# Patient Record
Sex: MALE | Race: BLACK OR AFRICAN AMERICAN | Employment: UNEMPLOYED | URBAN - METROPOLITAN AREA
[De-identification: names, ages, dates, MRNs, and addresses within clinical notes are randomized per-mention and may not be internally consistent; named-entity substitution may affect disease eponyms.]

---

## 2018-10-22 ENCOUNTER — OFFICE VISIT (OUTPATIENT)
Dept: NEUROLOGY | Age: 63
End: 2018-10-22

## 2018-10-22 VITALS
BODY MASS INDEX: 23.24 KG/M2 | OXYGEN SATURATION: 97 % | HEIGHT: 74 IN | DIASTOLIC BLOOD PRESSURE: 70 MMHG | SYSTOLIC BLOOD PRESSURE: 124 MMHG | RESPIRATION RATE: 18 BRPM | HEART RATE: 65 BPM | WEIGHT: 181.1 LBS

## 2018-10-22 DIAGNOSIS — M54.12 CERVICAL RADICULOPATHY: ICD-10-CM

## 2018-10-22 DIAGNOSIS — G56.80: Primary | ICD-10-CM

## 2018-10-22 RX ORDER — LISINOPRIL AND HYDROCHLOROTHIAZIDE 20; 25 MG/1; MG/1
TABLET ORAL DAILY
COMMUNITY

## 2018-10-22 RX ORDER — MELOXICAM 7.5 MG/1
TABLET ORAL DAILY
COMMUNITY

## 2018-10-22 RX ORDER — AMLODIPINE BESYLATE 5 MG/1
5 TABLET ORAL DAILY
COMMUNITY

## 2018-10-22 RX ORDER — LORATADINE 10 MG/1
10 TABLET ORAL
COMMUNITY
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
## 2018-10-22 NOTE — PATIENT INSTRUCTIONS
Information Regarding Testing     If you have physican order for a test or a medication denied by your insurance company, this does not mean the test or medication is not appropriate for you as that is a medical decision, not a decision to be made by an insurance company representative or by an [facility] physician who has not interviewed and examined you. This is a decision to be made between you and your physician. The denial of services is a contractual matter between you and your insurance company, not an issue between your physician and the insurance company. If your test or medication is denied, you can take the following steps to help resolve the issue:    1. File a complaint with the [facility] regarding your insurance company's denial of services ordered for you. You can do this either by calling them directly or by completing an on-line complaint form on the [employer]. This can be found at [email or web address]    2. Also file a formal complaint with your insurance company and ask to have the name of the person denying the service so that you may explore a legal option should you be harmed by this denial of service. Again, the fact the insurance company will not pay for the service does not mean it is not medically necessary and I would encourage you to follow through with the plan that was made with your physician    3. File a written complaint with your employer so your employer and benefit manager is aware of the poor coverage they are providing their employees. If you have medicare/medicaid, complain to your representative in the House and to your Ltia Mccrary. If we have ordered testing for you, we do not call patients with results and we do not give test results over the phone. We schedule follow up appointments so that your results can be discussed in person and any questions you have regarding them may be addressed.   If something of concern is revealed on your test, we will call you for a sooner follow up appointment. Additionally, results may be found by using the My Chart feature and one of our patient service representatives at the  can give you instructions on how to access this feature of our electronic medical record system. Learning About Living Raul  What is a living will? A living will is a legal form you use to write down the kind of care you want at the end of your life. It is used by the health professionals who will treat you if you aren't able to decide for yourself. If you put your wishes in writing, your loved ones and others will know what kind of care you want. They won't need to guess. This can ease your mind and be helpful to others. A living will is not the same as an estate or property will. An estate will explains what you want to happen with your money and property after you die. Is a living will a legal document? A living will is a legal document. Each state has its own laws about living escobar. If you move to another state, make sure that your living will is legal in the state where you now live. Or you might use a universal form that has been approved by many states. This kind of form can sometimes be completed and stored online. Your electronic copy will then be available wherever you have a connection to the Internet. In most cases, doctors will respect your wishes even if you have a form from a different state. · You don't need an  to complete a living will. But legal advice can be helpful if your state's laws are unclear, your health history is complicated, or your family can't agree on what should be in your living will. · You can change your living will at any time. Some people find that their wishes about end-of-life care change as their health changes. · In addition to making a living will, think about completing a medical power of  form.  This form lets you name the person you want to make end-of-life treatment decisions for you (your \"health care agent\") if you're not able to. Many hospitals and nursing homes will give you the forms you need to complete a living will and a medical power of . · Your living will is used only if you can't make or communicate decisions for yourself anymore. If you become able to make decisions again, you can accept or refuse any treatment, no matter what you wrote in your living will. · Your state may offer an online registry. This is a place where you can store your living will online so the doctors and nurses who need to treat you can find it right away. What should you think about when creating a living will? Talk about your end-of-life wishes with your family members and your doctor. Let them know what you want. That way the people making decisions for you won't be surprised by your choices. Think about these questions as you make your living will:  · Do you know enough about life support methods that might be used? If not, talk to your doctor so you know what might be done if you can't breathe on your own, your heart stops, or you're unable to swallow. · What things would you still want to be able to do after you receive life-support methods? Would you want to be able to walk? To speak? To eat on your own? To live without the help of machines? · If you have a choice, where do you want to be cared for? In your home? At a hospital or nursing home? · Do you want certain Yazidism practices performed if you become very ill? · If you have a choice at the end of your life, where would you prefer to die? At home? In a hospital or nursing home? Somewhere else? · Would you prefer to be buried or cremated? · Do you want your organs to be donated after you die? What should you do with your living will? · Make sure that your family members and your health care agent have copies of your living will.   · Give your doctor a copy of your living will to keep in your medical record. If you have more than one doctor, make sure that each one has a copy. · You may want to put a copy of your living will where it can be easily found. Where can you learn more? Go to http://donta-ramos.info/. Enter T082 in the search box to learn more about \"Learning About Living Raul. \"  Current as of: April 19, 2018  Content Version: 11.8  © 3003-2540 [employer]. Care instructions adapted under license by [facility] [facility] (which disclaims liability or warranty for this information). If you have questions about a medical condition or this instruction, always ask your healthcare professional. [place] [address] any warranty or liability for your use of this information. Patient history reviewed and patient examined. Would like to do an EMG nerve conduction of patient's both upper extremities and would like to do formal imaging of his neck as well. Was warned about the nerve entrapment points at his wrists and elbows. Revisit once all accomplished.

## 2018-11-07 ENCOUNTER — OFFICE VISIT (OUTPATIENT)
Dept: NEUROLOGY | Age: 63
End: 2018-11-07

## 2018-11-07 DIAGNOSIS — G56.10 LESION OF MEDIAN NERVE, UNSPECIFIED LATERALITY: ICD-10-CM

## 2018-11-07 DIAGNOSIS — M54.12 CERVICAL RADICULOPATHY: Primary | ICD-10-CM

## 2018-11-07 NOTE — PROGRESS NOTES
EMG/ NCS Report  [facility]  - [facility]  [facility]  [facility], [address] [place] [address] [address]   Ph: [phone]   FAX: [phone]/ [phone]  Test Date:  2018    Patient: Yennifer Love : 1955 Physician: Piotr Valdez MD   Sex: Male Height: ' \" Ref Phys: Simon North IV, MD   ID#: [id number] Weight:  lbs. Technician: Jose Enrique Galeano     Patient History / Exam:  CC:CERVICAL RADICULOPATHY,NEUROPATHY EBONY UPPER,R/O CTS          EMG & NCV Findings:  Evaluation of the left median motor and the right median motor nerves showed normal distal onset latency (L3.5, R4.5 ms), normal amplitude (L9.5, R10.1 mV), and normal conduction velocity (Elbow-Wrist, L53, R55 m/s). The left ulnar motor and the right ulnar motor nerves showed prolonged distal onset latency (L3.2, R3.4 ms), normal amplitude (L9.9, R11.2 mV), normal conduction velocity (B Elbow-Wrist, L59, R55 m/s), and normal conduction velocity (A Elbow-B Elbow, L56, R59 m/s). The left median sensory, the right median sensory, and the left ulnar sensory nerves showed prolonged distal peak latency (L4.4, R4.3, L4.2 ms) and normal amplitude (L14.9, R13.0, L14.1 µV). The left radial sensory, the right radial sensory, and the right ulnar sensory nerves showed normal distal peak latency (L2.5, R2.2, R3.8 ms) and normal amplitude (L25.8, R31.7, R9.4 µV). All left vs. right side differences were within normal limits. All F Wave latencies were within normal limits. All F Wave left vs. right side latency differences were within normal limits. All examined muscles (as indicated in the following table) showed no evidence of electrical instability.         Impression:        ___________________________  Simon North IV, MD      Nerve Conduction Studies  Anti Sensory Summary Table     Stim Site NR Peak (ms) Norm Peak (ms) P-T Amp (µV) Norm P-T Amp Site1 Site2 Dist (cm)   Left Median Anti Sensory (2nd Digit) 30.3°C   Wrist    4.4 <4 14.9 >13 Wrist 2nd Digit 14.0   Elbow    4.3  15.5  Elbow Wrist 0.0   Right Median Anti Sensory (2nd Digit)  32.4°C   Wrist    4.3 <4 13.0 >13 Wrist 2nd Digit 14.0   Elbow    4.5  15.0  Elbow Wrist 0.0   Left Radial Anti Sensory (Base 1st Digit)  30.1°C   Wrist    2.5 <2.8 25.8 >11 Wrist Base 1st Digit 10.0   Right Radial Anti Sensory (Base 1st Digit)  31°C   Wrist    2.2 <2.8 31.7 >11 Wrist Base 1st Digit 10.0   Site 2    2.3  34.6       Left Ulnar Anti Sensory (5th Digit)  30.1°C   Wrist    4.2 <4.0 14.1 >9 Wrist 5th Digit 14.0   B Elbow    4.1  14.0  B Elbow Wrist 0.0   Right Ulnar Anti Sensory (5th Digit)  31.3°C   Wrist    3.8 <4.0 9.4 >9 Wrist 5th Digit 14.0   B Elbow    4.1  8.7  B Elbow Wrist 0.0     Motor Summary Table     Stim Site NR Onset (ms) Norm Onset (ms) O-P Amp (mV) Norm O-P Amp Amp (Prev) (%) Site1 Site2 Dist (cm) Tom (m/s) Norm Tom (m/s)   Left Median Motor (Abd Poll Brev)  29.8°C   Wrist    3.5 <4.5 9.5 >4.1 100.0 Wrist Abd Poll Brev 8.0     Elbow    8.0  9.4  98.9 Elbow Wrist 24.0 53 >49   Right Median Motor (Abd Poll Brev)  30.7°C   Wrist    4.5 <4.5 10.1 >4.1 100.0 Wrist Abd Poll Brev 8.0     Elbow    8.8  9.4  93.1 Elbow Wrist 23.5 55 >49   Left Ulnar Motor (Abd Dig Minimi)  30.7°C   Wrist    3.2 <3.1 9.9 >7.0 100.0 Wrist Abd Dig Minimi 8.0  >50   B Elbow    7.3  8.4  84.8 B Elbow Wrist 24.0 59 >50   A Elbow    9.1  7.7  91.7 A Elbow B Elbow 10.0 56 >50   Med Elbow    3.2  9.8  127.3 Med Elbow A Elbow 0.0     Right Ulnar Motor (Abd Dig Minimi)  30.4°C   Wrist    3.4 <3.1 11.2 >7.0 100.0 Wrist Abd Dig Minimi 8.0  >50   B Elbow    7.4  9.9  88.4 B Elbow Wrist 22.0 55 >50   A Elbow    9.1  9.6  97.0 A Elbow B Elbow 10.0 59 >50     F Wave Studies     NR F-Lat (ms) Lat Norm (ms) L-R F-Lat (ms) L-R Lat Norm   Left Ulnar (Mrkrs) (Abd Dig Min)  29.9°C      31.60 <32 0.00 <2.5   Right Ulnar (Mrkrs) (Abd Dig Min)  30.2°C      31.60 <32 0.00 <2.5     EMG     Side Muscle Nerve Root Ins Act Fibs Psw Recrt Duration Amp Poly Comment   Right Abd Poll Brev Median C8-T1 Nml Nml Nml Nml Nml Nml Nml    Right BrachioRad Radial C5-6 Nml Nml Nml Nml Nml Nml Nml    Right Biceps Musculocut C5-6 Nml Nml Nml Nml Nml Nml Nml    Right Triceps Radial C6-7-8 Nml Nml Nml Nml Nml Nml Nml    Right Deltoid Axillary C5-6 Nml Nml Nml Nml Nml Nml Nml    Right Mid Cerv Parasp Rami C5,6 Nml Nml Nml Nml Nml Nml Nml    Right Lower Cerv Parasp Rami C7,T1 Nml Nml Nml Nml Nml Nml Nml    Left Mid Cerv Parasp Rami C5,6 Nml Nml Nml Nml Nml Nml Nml    Left Lower Cerv Parasp Rami C7,T1 Nml Nml Nml Nml Nml Nml Nml    Left Abd Poll Brev Median C8-T1 Nml Nml Nml Nml Nml Nml Nml    Left BrachioRad Radial C5-6 Nml Nml Nml Nml Nml Nml Nml    Left Biceps Musculocut C5-6 Nml Nml Nml Nml Nml Nml Nml    Left Triceps Radial C6-7-8 Nml Nml Nml Nml Nml Nml Nml    Left Deltoid Axillary C5-6 Nml Nml Nml Nml Nml Nml Nml                Nerve Conduction Studies  Anti Sensory Left/Right Comparison     Stim Site L Lat (ms) R Lat (ms) L-R Lat (ms) L Amp (µV) R Amp (µV) L-R Amp (%) Site1 Site2 L Tom (m/s) R Tom (m/s) L-R Tom (m/s)   Median Anti Sensory (2nd Digit)  30.3°C   Wrist 3.4 3.5 0.1 14.9 13.0 12.8 Wrist 2nd Digit 41 40 1   Elbow 3.3 3.7 0.4 15.5 15.0 3.2 Elbow Wrist      Radial Anti Sensory (Base 1st Digit)  30.1°C   Wrist 1.8 1.6 0.2 25.8 31.7 18.6 Wrist Base 1st Digit 56 63 7   Ulnar Anti Sensory (5th Digit)  30.1°C   Wrist 3.0 3.1 0.1 14.1 9.4 33.3 Wrist 5th Digit 47 45 2   B Elbow 3.2 2.8 0.4 14.0 8.7 37.9 B Elbow Wrist        Motor Left/Right Comparison     Stim Site L Lat (ms) R Lat (ms) L-R Lat (ms) L Amp (mV) R Amp (mV) L-R Amp (%) Site1 Site2 L Tom (m/s) R Tom (m/s) L-R Tom (m/s)   Median Motor (Abd Poll Brev)  29.8°C   Wrist 3.5 4.5 1.0 9.5 10.1 5.9 Wrist Abd Poll Brev      Elbow 8.0 8.8 0.8 9.4 9.4 0.0 Elbow Wrist 53 55 2   Ulnar Motor (Abd Dig Minimi)  30.7°C   Wrist 3.2 3.4 0.2 9.9 11.2 11.6 Wrist Abd Dig Minimi      B Elbow 7.3 7.4 0.1 8.4 9.9 15.2 B Elbow Wrist 59 55 4   A Elbow 9.1 9.1 0.0 7.7 9.6 19.8 A Elbow B Elbow 56 59 3   Med Elbow 3.2   9.8   Med Elbow A Elbow            Waveforms:

## 2018-11-07 NOTE — PROGRESS NOTES
This is an elective routine EMG and nerve conduction of patient's upper extremities. Patient history. Patient has had years of bilateral upper extremity paresthesias that manifests from the elbows to the hands. Can appear randomly in the median distribution fingers on the right versus ulnar on the left? No associated pain components no diabetes and had seen orthopedics had nothing to offer. Was at [facility] and had an EMG and nerve conduction done years ago but he recalls no diagnosis or suggestions or therapy options discussed. Patient exam.  Patient is alert cooperative fluent articulate upbeat and behaviorally appropriate. Cranial nerves II through XII normal.  Cerebellar testing finger-nose-finger toe to finger intact. Motor 5/5. Sensory intact to touch temperature and vibratory. Reflexes +2. Normal response to provocative maneuvers such as Tinel's over the wrist and ulnar grooves Phalen's maneuver and Adson's maneuver. Gait and transfers normal.    EMG nerve conduction findings. 1.  EMG needle probing following insertion was normal.  No evidence of acute denervation or chronic denervation/reinnervation or myopathic potentials. Motor unit recruitment as to morphology number and time sequencing was all normal.  Patient tolerated procedure without any ill effects. This examination included right and left cervical paraspinals. 2.  The nerve conduction portion revealed a delay in the right and left median sensorys and left ulnar sensory as well in a mild degree. There was also a subtle delay in the right and left ulnar motor latencies at the wrists. Borderline delay in the right median motor latency at the wrist.    Impression: This study suggest distal R/L ulnar and median neuropathies at the wrists. These are admittedly mild by degree and no other pathology is spotted  Clinical correlation is advised. AVIS JAY..

## 2018-12-11 ENCOUNTER — HOSPITAL ENCOUNTER (OUTPATIENT)
Dept: MRI IMAGING | Age: 63
Discharge: HOME | End: 2018-12-11
Payer: MEDICAID

## 2018-12-11 DIAGNOSIS — M54.12 CERVICAL RADICULOPATHY: ICD-10-CM

## 2018-12-11 PROCEDURE — 72141 MRI NECK SPINE W/O DYE: CPT

## 2019-01-07 ENCOUNTER — OFFICE VISIT (OUTPATIENT)
Dept: NEUROLOGY | Age: 64
End: 2019-01-07

## 2019-01-07 VITALS
HEART RATE: 91 BPM | BODY MASS INDEX: 22.95 KG/M2 | OXYGEN SATURATION: 96 % | SYSTOLIC BLOOD PRESSURE: 128 MMHG | HEIGHT: 74 IN | DIASTOLIC BLOOD PRESSURE: 80 MMHG | WEIGHT: 178.8 LBS | RESPIRATION RATE: 20 BRPM

## 2019-01-07 DIAGNOSIS — M48.02 CERVICAL STENOSIS OF SPINE: Primary | ICD-10-CM

## 2019-01-07 DIAGNOSIS — G56.03 BILATERAL CARPAL TUNNEL SYNDROME: ICD-10-CM

## 2019-01-07 NOTE — PROGRESS NOTES
Appointment scheduled for 12/19/2017 Neurology Consult      Subjective:      Lavern Boston is a 58 y.o. male who comes in today with results that will be shared with him in the following respects. The EMG was normal of both upper extremities and the nerve conduction showed delays in the right and left median sensory is at the wrist and the left ulnar as well. Subtle delay in the right and left ulnar motor latencies at the wrist and borderline delay in the right median motor at the wrist.  Told him to  carpal tunnel splints at the pharmacy that fit well and wear them nightly and take him off during the day. May take a couple of weeks to notice any change with this procedure. On the MRI of his neck he has severe bilateral neuroforaminal encroachment C6-7 and C7-T1 and mild to moderate on the left at C4-5. At the same level he has mild right neuroforaminal encroachment. Patient is feeling much better and I am happy for him. Hopefully on the neck findings this will be asymptomatic but of course there are no guarantees and aging always has its own direction. Will suggest a revisit back here as needed and will print out the results of the MRI and he can take it with him when he sees his doctor in [facility]. Good luck. His exam today looks fantastic. No Known Allergies  Past Medical History:   Diagnosis Date    Arthritis       No past surgical history on file.    Social History     Socioeconomic History    Marital status: SINGLE     Spouse name: Not on file    Number of children: Not on file    Years of education: Not on file    Highest education level: Not on file   Social Needs    Financial resource strain: Not on file    Food insecurity - worry: Not on file    Food insecurity - inability: Not on file    Transportation needs - medical: Not on file   [employer] needs - non-medical: Not on file   Occupational History    Not on file   Tobacco Use    Smoking status: Never Smoker    Smokeless tobacco: Never Used   Substance and Sexual Activity    Alcohol use: No     Frequency: Never    Drug use: No    Sexual activity: Not on file   Other Topics Concern    Not on file   Social History Narrative    Not on file      No family history on file. Visit Vitals  /80   Pulse 91   Resp 20   Ht 6' 2\" (1.88 m)   Wt 81.1 kg (178 lb 12.8 oz)   SpO2 96%   BMI 22.96 kg/m²        Review of Systems:   A comprehensive review of systems was negative except for that written in the HPI. Neuro Exam:     Appearance: The patient is well developed, well nourished, provides a coherent history and is in no acute distress. Mental Status: Oriented to time, place and person. Mood and affect appropriate. Cranial Nerves:   Intact visual fields. Fundi are benign. GRANT, EOM's full, no nystagmus, no ptosis. Facial sensation is normal. Corneal reflexes are intact. Facial movement is symmetric. Hearing is normal bilaterally. Palate is midline with normal sternocleidomastoid and trapezius muscles are normal. Tongue is midline. Motor:  5/5 strength in upper and lower proximal and distal muscles. Normal bulk and tone. No fasciculations. Reflexes:   Deep tendon reflexes 2+/4 and symmetrical.   Sensory:   Normal to touch, pinprick and vibration. Gait:  Normal gait. Tremor:   No tremor noted. Cerebellar:  No cerebellar signs present. Neurovascular:  Normal heart sounds and regular rhythm, peripheral pulses intact, and no carotid bruits. Assessment:   Problem 1 cervical spondylosis. Will have him take a copy of the MRI of the cervical spine results to his primary care and [place]. Hopefully he will have no repercussions although he does have multilevel degenerative joint and disc disease. Problem 2 Bilateral Carpal Tunl. Recommended he  a carpal tunnel splint for both hands and wear that nightly and it may take several weeks to notice improvement. Plan:   Revisit as needed.   Signed by :  Emily Harris IV MD

## 2019-01-07 NOTE — PATIENT INSTRUCTIONS
A Healthy Lifestyle: Care Instructions  Your Care Instructions    A healthy lifestyle can help you feel good, stay at a healthy weight, and have plenty of energy for both work and play. A healthy lifestyle is something you can share with your whole family. A healthy lifestyle also can lower your risk for serious health problems, such as high blood pressure, heart disease, and diabetes. You can follow a few steps listed below to improve your health and the health of your family. Follow-up care is a key part of your treatment and safety. Be sure to make and go to all appointments, and call your doctor if you are having problems. It's also a good idea to know your test results and keep a list of the medicines you take. How can you care for yourself at home? · Do not eat too much sugar, fat, or fast foods. You can still have dessert and treats now and then. The goal is moderation. · Start small to improve your eating habits. Pay attention to portion sizes, drink less juice and soda pop, and eat more fruits and vegetables. ? Eat a healthy amount of food. A 3-ounce serving of meat, for example, is about the size of a deck of cards. Fill the rest of your plate with vegetables and whole grains. ? Limit the amount of soda and sports drinks you have every day. Drink more water when you are thirsty. ? Eat at least 5 servings of fruits and vegetables every day. It may seem like a lot, but it is not hard to reach this goal. A serving or helping is 1 piece of fruit, 1 cup of vegetables, or 2 cups of leafy, raw vegetables. Have an apple or some carrot sticks as an afternoon snack instead of a candy bar. Try to have fruits and/or vegetables at every meal.  · Make exercise part of your daily routine. You may want to start with simple activities, such as walking, bicycling, or slow swimming. Try to be active 30 to 60 minutes every day. You do not need to do all 30 to 60 minutes all at once.  For example, you can exercise 3 times a day for 10 or 20 minutes. Moderate exercise is safe for most people, but it is always a good idea to talk to your doctor before starting an exercise program.  · Keep moving. [employer] the lawn, work in the garden, or [employer]. Take the stairs instead of the elevator at work. · If you smoke, quit. People who smoke have an increased risk for heart attack, stroke, cancer, and other lung illnesses. Quitting is hard, but there are ways to boost your chance of quitting tobacco for good. ? Use nicotine gum, patches, or lozenges. ? Ask your doctor about stop-smoking programs and medicines. ? Keep trying. In addition to reducing your risk of diseases in the future, you will notice some benefits soon after you stop using tobacco. If you have shortness of breath or asthma symptoms, they will likely get better within a few weeks after you quit. · Limit how much alcohol you drink. Moderate amounts of alcohol (up to 2 drinks a day for men, 1 drink a day for women) are okay. But drinking too much can lead to liver problems, high blood pressure, and other health problems. Family health  If you have a family, there are many things you can do together to improve your health. · Eat meals together as a family as often as possible. · Eat healthy foods. This includes fruits, vegetables, lean meats and dairy, and whole grains. · Include your family in your fitness plan. Most people think of activities such as jogging or tennis as the way to fitness, but there are many ways you and your family can be more active. Anything that makes you breathe hard and gets your heart pumping is exercise. Here are some tips:  ? Walk to do errands or to take your child to school or the bus.  ? Go for a family bike ride after dinner instead of watching TV. Where can you learn more? Go to http://donta-ramos.info/. Enter A239 in the search box to learn more about \"A Healthy Lifestyle: Care Instructions. \"  Current as of: December 7, 2017  Content Version: 11.8  © 8304-5101 [employer]. Care instructions adapted under license by [employer] (which disclaims liability or warranty for this information). If you have questions about a medical condition or this instruction, always ask your healthcare professional. [address] any warranty or liability for your use of this information. Learning About Peripheral Neuropathy  What is peripheral neuropathy? Peripheral neuropathy is a problem that affects the peripheral nerves. These nerves lead from the spinal cord to other parts of the body. They control your sense of touch, how you feel pain and temperature, and your muscle strength. Most of the time the problem starts in the fingers and toes. As it gets worse, it moves into the limbs. It can cause pain and loss of feeling in the feet, legs, and hands. What causes it? There are several causes of peripheral neuropathy:  · Diabetes. This is the most common cause. If your blood sugar is too high for too long, it can damage the nerves. · Kidney problems. These can lead to toxic substances in the blood that damage nerves. · Overusing alcohol and not eating a healthy diet. These can lead to your body not having enough of certain vitamins, such as vitamin B-12. This can damage nerves. · Infectious or inflammatory diseases. These include HIV and Guillain-Barré syndrome. These diseases can damage the nerves. · Being exposed to toxic substances. These include certain medicines, such as those used for chemotherapy. · Low levels of thyroid hormone (hypothyroidism). Sometimes the cause is not known. What are the symptoms? Symptoms of peripheral neuropathy can occur slowly over time. The most common ones are:  · Numbness, tightness, and tingling, especially in the legs, hands, and feet. · Loss of feeling. · Burning, shooting, or stabbing pain in the legs, hands, and feet.  Often the pain is worse at night. · Weakness and loss of balance. What can happen if you have it? If peripheral neuropathy gets worse, it can lead to a complete lack of feeling in your hands or feet. This can make you more likely to injure them. It may lead to calluses and blisters. It can also lead to bone and joint problems, infection, and ulcers. For instance, small, repeated injuries to the foot may lead to bigger problems. This can happen because you can't feel the injuries. Reduced feeling in the feet can also change your step, leading to bone or joint problems. If untreated, foot problems can become so severe that the foot or lower leg may have to be amputated. But treatment can slow down peripheral neuropathy. And it's a good idea to take care to avoid injury. How is it diagnosed? To diagnose peripheral neuropathy, your doctor will ask you about:  · Your symptoms. · Your medical history. This may include your use of alcohol, risk of HIV infection, or exposure to toxic substances. · Your family's medical history, including nerve disease. Your doctor may test how well you can feel touch, temperature, and pain. You may also have blood tests. These tests will help the doctor find out if you have conditions that can cause neuropathy. Examples are diabetes, vitamin deficiencies, thyroid disease, and kidney problems. How is it treated? Treatment for peripheral neuropathy can relieve symptoms. This is done by treating the health problem that's causing it. For example, if you have diabetes, keeping your blood sugar within your target range may help. Or maybe your body lacks certain vitamins caused by drinking too much alcohol. In that case, treatment may include eating a healthy diet, taking vitamins, and stopping alcohol use. You may have physical therapy. This can increase muscle strength and help build muscle control. Over-the-counter medicine can relieve mild nerve pain.  Your doctor may also prescribe medicine to help with severe pain, numbness, tingling, and weakness. If you have neuropathy in your feet, it's a good idea to have them checked during each office visit. This can help prevent problems. Some people find that physical therapy, acupuncture, or transcutaneous electrical nerve stimulation (TENS) helps relieve pain. Follow-up care is a key part of your treatment and safety. Be sure to make and go to all appointments, and call your doctor if you are having problems. It's also a good idea to know your test results and keep a list of the medicines you take. Where can you learn more? Go to http://donta-ramos.info/. Enter P130 in the search box to learn more about \"Learning About Peripheral Neuropathy. \"  Current as of: November 28, 2017  Content Version: 11.8  © 2188-1575 [employer]. Care instructions adapted under license by [employer] (which disclaims liability or warranty for this information). If you have questions about a medical condition or this instruction, always ask your healthcare professional. [place] [address] any warranty or liability for your use of this information. Patient history reviewed and patient examined. Patient feeling better which is important and went over the results of his EMG and nerve conduction and can  a carpal tunnel splint for both hands and wear it at night and take it off during the day and results may take several weeks to notice. Will copy his MRI of the cervical spine results and he can share with his physician down in [facility]. Is welcome back here as needed.

## 2019-01-07 NOTE — PROGRESS NOTES
Patient is here for a follow up for neuropathy. Patient states that he has been feeling pretty normal in regards to his neuropathy. Patient has no concerns. Wants to follow up on results for MRI.

## 2023-02-02 ENCOUNTER — TRANSCRIBE ORDER (OUTPATIENT)
Dept: SCHEDULING | Age: 68
End: 2023-02-02

## 2023-02-02 DIAGNOSIS — M54.12 BRACHIAL NEURITIS: Primary | ICD-10-CM

## 2023-02-05 DIAGNOSIS — M54.12 BRACHIAL NEURITIS: Primary | ICD-10-CM
